# Patient Record
Sex: MALE | Race: WHITE | NOT HISPANIC OR LATINO | URBAN - METROPOLITAN AREA
[De-identification: names, ages, dates, MRNs, and addresses within clinical notes are randomized per-mention and may not be internally consistent; named-entity substitution may affect disease eponyms.]

---

## 2018-02-10 ENCOUNTER — EMERGENCY (EMERGENCY)
Facility: HOSPITAL | Age: 28
LOS: 1 days | Discharge: HOME | End: 2018-02-10
Attending: EMERGENCY MEDICINE | Admitting: EMERGENCY MEDICINE
Payer: COMMERCIAL

## 2018-02-10 VITALS
OXYGEN SATURATION: 100 % | DIASTOLIC BLOOD PRESSURE: 68 MMHG | RESPIRATION RATE: 16 BRPM | TEMPERATURE: 98 F | HEART RATE: 92 BPM | SYSTOLIC BLOOD PRESSURE: 124 MMHG

## 2018-02-10 VITALS
RESPIRATION RATE: 18 BRPM | SYSTOLIC BLOOD PRESSURE: 140 MMHG | HEART RATE: 118 BPM | OXYGEN SATURATION: 99 % | WEIGHT: 210.1 LBS | TEMPERATURE: 98 F | DIASTOLIC BLOOD PRESSURE: 100 MMHG | HEIGHT: 74 IN

## 2018-02-10 PROCEDURE — 12002 RPR S/N/AX/GEN/TRNK2.6-7.5CM: CPT

## 2018-02-10 PROCEDURE — 99283 EMERGENCY DEPT VISIT LOW MDM: CPT | Mod: 25

## 2018-02-10 PROCEDURE — 73630 X-RAY EXAM OF FOOT: CPT

## 2018-02-10 PROCEDURE — 99284 EMERGENCY DEPT VISIT MOD MDM: CPT

## 2018-02-10 PROCEDURE — 73630 X-RAY EXAM OF FOOT: CPT | Mod: 26,LT

## 2018-02-10 RX ORDER — CEPHALEXIN 500 MG
500 CAPSULE ORAL ONCE
Qty: 0 | Refills: 0 | Status: COMPLETED | OUTPATIENT
Start: 2018-02-10 | End: 2018-02-10

## 2018-02-10 RX ORDER — CEPHALEXIN 500 MG
1 CAPSULE ORAL
Qty: 10 | Refills: 0 | OUTPATIENT
Start: 2018-02-10 | End: 2018-02-14

## 2018-02-10 RX ADMIN — Medication 500 MILLIGRAM(S): at 14:11

## 2018-02-10 NOTE — ED PROVIDER NOTE - OBJECTIVE STATEMENT
28 y/o M pt no PMHx presents to the ED c/o left foot laceration that he sustained from an ax while splitting wood today. Pt states the ax slipped off the wood and went through his boot. Pt is up to date on Tetanus (10/2017). Denies fever, tingling. No further complaints at this time. NKDA.

## 2018-02-10 NOTE — ED PROCEDURE NOTE - NS_ ATTENDINGSCRIBEDETAILS _ED_A_ED_FT
Roberto Del Rio MD - The scribe's documentation has been prepared under my direction and personally reviewed by me in its entirety. I confirm that the note above accurately reflects all work, treatment, procedures, and medical decision making performed by me.

## 2019-04-24 NOTE — ED ADULT NURSE NOTE - PT NEEDS ASSIST
Response To Light: no significant findings
Wood's Lamp Text: A Wood's Lamp was used to illuminate areas of the skin with a black light. The light was held over the suspected areas in a darkened room.
Detail Level: Zone
no

## 2024-12-15 ENCOUNTER — OFFICE (OUTPATIENT)
Dept: URBAN - METROPOLITAN AREA CLINIC 109 | Facility: CLINIC | Age: 34
Setting detail: OPHTHALMOLOGY
End: 2024-12-15
Payer: COMMERCIAL

## 2024-12-15 DIAGNOSIS — H52.13: ICD-10-CM

## 2024-12-15 PROCEDURE — RX/CHECK RX/CHECK: Performed by: OPTOMETRIST

## 2024-12-15 ASSESSMENT — TONOMETRY
OD_IOP_MMHG: 18
OS_IOP_MMHG: 20

## 2024-12-15 ASSESSMENT — REFRACTION_MANIFEST
OD_AXIS: 015
OD_VA1: 20/20
OS_SPHERE: -1.00
OS_CYLINDER: -0.25
OD_SPHERE: -1.25
OD_CYLINDER: -1.50
OS_AXIS: 175
OS_VA1: 20/20

## 2024-12-15 ASSESSMENT — REFRACTION_CURRENTRX
OS_VPRISM_DIRECTION: SV
OS_SPHERE: -1.50
OS_CYLINDER: -0.25
OD_VPRISM_DIRECTION: SV
OD_SPHERE: -1.25
OD_AXIS: 12
OD_OVR_VA: 20/
OS_OVR_VA: 20/
OS_AXIS: 173
OD_CYLINDER: -1.25

## 2024-12-15 ASSESSMENT — REFRACTION_AUTOREFRACTION
OD_SPHERE: -1.25
OD_AXIS: 014
OS_SPHERE: -1.25
OS_CYLINDER: -0.25
OS_AXIS: 150
OD_CYLINDER: -1.50

## 2024-12-15 ASSESSMENT — KERATOMETRY
OD_K1POWER_DIOPTERS: 43.00
OS_K1POWER_DIOPTERS: 43.25
OS_K2POWER_DIOPTERS: 43.75
OD_AXISANGLE_DEGREES: 100
OS_AXISANGLE_DEGREES: 091
OD_K2POWER_DIOPTERS: 44.75

## 2024-12-15 ASSESSMENT — VISUAL ACUITY
OD_BCVA: 20/
OS_BCVA: 20/20

## 2024-12-15 ASSESSMENT — CONFRONTATIONAL VISUAL FIELD TEST (CVF)
OD_FINDINGS: FULL
OS_FINDINGS: FULL

## 2025-01-23 ASSESSMENT — REFRACTION_CURRENTRX
OS_AXIS: 175
OD_OVR_VA: 20/
OD_SPHERE: -1.25
OD_AXIS: 10
OS_VPRISM_DIRECTION: SV
OD_CYLINDER: -1.25
OS_CYLINDER: -0.25
OS_SPHERE: -1.50
OS_OVR_VA: 20/
OD_VPRISM_DIRECTION: SV

## 2025-01-23 ASSESSMENT — REFRACTION_MANIFEST
OS_SPHERE: -1.25
OD_SPHERE: -1.25
OD_VA1: 20/20
OS_AXIS: 175
OD_AXIS: 010
OS_VA1: 20/20
OS_CYLINDER: -0.25
OD_CYLINDER: -1.50

## 2025-01-23 ASSESSMENT — KERATOMETRY
OS_AXISANGLE_DEGREES: 091
OD_K1POWER_DIOPTERS: 43.00
OD_K2POWER_DIOPTERS: 44.75
OS_K2POWER_DIOPTERS: 43.75
OS_K1POWER_DIOPTERS: 43.25
OD_AXISANGLE_DEGREES: 100